# Patient Record
Sex: MALE | Race: WHITE | NOT HISPANIC OR LATINO | Employment: FULL TIME | URBAN - METROPOLITAN AREA
[De-identification: names, ages, dates, MRNs, and addresses within clinical notes are randomized per-mention and may not be internally consistent; named-entity substitution may affect disease eponyms.]

---

## 2017-05-12 DIAGNOSIS — I10 ESSENTIAL HYPERTENSION: ICD-10-CM

## 2017-05-12 RX ORDER — HYDROCHLOROTHIAZIDE 25 MG/1
TABLET ORAL
Qty: 90 TABLET | Refills: 0 | OUTPATIENT
Start: 2017-05-12

## 2017-05-12 RX ORDER — IRBESARTAN 300 MG/1
TABLET ORAL
Qty: 90 TABLET | Refills: 0 | OUTPATIENT
Start: 2017-05-12

## 2017-05-17 ENCOUNTER — OFFICE VISIT (OUTPATIENT)
Dept: FAMILY MEDICINE CLINIC | Facility: CLINIC | Age: 42
End: 2017-05-17

## 2017-05-17 VITALS
HEART RATE: 115 BPM | SYSTOLIC BLOOD PRESSURE: 154 MMHG | DIASTOLIC BLOOD PRESSURE: 98 MMHG | TEMPERATURE: 98.8 F | RESPIRATION RATE: 16 BRPM | BODY MASS INDEX: 34.87 KG/M2 | WEIGHT: 217 LBS | HEIGHT: 66 IN

## 2017-05-17 DIAGNOSIS — R73.9 HYPERGLYCEMIA: ICD-10-CM

## 2017-05-17 DIAGNOSIS — K21.9 GASTROESOPHAGEAL REFLUX DISEASE, ESOPHAGITIS PRESENCE NOT SPECIFIED: ICD-10-CM

## 2017-05-17 DIAGNOSIS — I10 ESSENTIAL HYPERTENSION: Primary | ICD-10-CM

## 2017-05-17 DIAGNOSIS — E78.49 OTHER HYPERLIPIDEMIA: ICD-10-CM

## 2017-05-17 PROCEDURE — 99214 OFFICE O/P EST MOD 30 MIN: CPT | Performed by: FAMILY MEDICINE

## 2017-05-17 RX ORDER — IRBESARTAN 300 MG/1
300 TABLET ORAL NIGHTLY
Qty: 90 TABLET | Refills: 1 | Status: SHIPPED | OUTPATIENT
Start: 2017-05-17 | End: 2017-12-13 | Stop reason: ALTCHOICE

## 2017-05-17 RX ORDER — RANITIDINE 150 MG/1
150 CAPSULE ORAL 2 TIMES DAILY
Qty: 180 CAPSULE | Refills: 1 | Status: SHIPPED | OUTPATIENT
Start: 2017-05-17 | End: 2022-09-26

## 2017-05-17 RX ORDER — HYDROCHLOROTHIAZIDE 25 MG/1
25 TABLET ORAL DAILY
Qty: 90 TABLET | Refills: 1 | Status: SHIPPED | OUTPATIENT
Start: 2017-05-17 | End: 2017-12-13 | Stop reason: SDUPTHER

## 2017-05-17 RX ORDER — ATORVASTATIN CALCIUM 10 MG/1
10 TABLET, FILM COATED ORAL NIGHTLY
Qty: 90 TABLET | Refills: 1 | Status: SHIPPED | OUTPATIENT
Start: 2017-05-17 | End: 2017-11-22

## 2017-05-17 RX ORDER — AMLODIPINE BESYLATE 10 MG/1
10 TABLET ORAL DAILY
Qty: 90 TABLET | Refills: 1 | Status: SHIPPED | OUTPATIENT
Start: 2017-05-17 | End: 2017-12-13 | Stop reason: SDUPTHER

## 2017-05-17 RX ORDER — CELECOXIB 200 MG/1
CAPSULE ORAL
Refills: 0 | COMMUNITY
Start: 2017-02-22 | End: 2017-12-13

## 2017-11-16 ENCOUNTER — TELEPHONE (OUTPATIENT)
Dept: FAMILY MEDICINE CLINIC | Facility: CLINIC | Age: 42
End: 2017-11-16

## 2017-12-06 ENCOUNTER — TRANSCRIBE ORDERS (OUTPATIENT)
Dept: ADMINISTRATIVE | Facility: HOSPITAL | Age: 42
End: 2017-12-06

## 2017-12-06 ENCOUNTER — OFFICE (OUTPATIENT)
Dept: URBAN - METROPOLITAN AREA CLINIC 75 | Facility: CLINIC | Age: 42
End: 2017-12-06

## 2017-12-06 VITALS
HEART RATE: 80 BPM | DIASTOLIC BLOOD PRESSURE: 62 MMHG | WEIGHT: 200 LBS | SYSTOLIC BLOOD PRESSURE: 110 MMHG | HEIGHT: 66 IN

## 2017-12-06 DIAGNOSIS — R94.5 ABNORMAL RESULTS OF LIVER FUNCTION STUDIES: ICD-10-CM

## 2017-12-06 DIAGNOSIS — R79.89 ABNORMAL LFTS: Primary | ICD-10-CM

## 2017-12-06 DIAGNOSIS — Z01.89 ENCOUNTER FOR OTHER SPECIFIED SPECIAL EXAMINATIONS: ICD-10-CM

## 2017-12-06 PROCEDURE — 99202 OFFICE O/P NEW SF 15 MIN: CPT

## 2017-12-13 ENCOUNTER — OFFICE VISIT (OUTPATIENT)
Dept: FAMILY MEDICINE CLINIC | Facility: CLINIC | Age: 42
End: 2017-12-13

## 2017-12-13 VITALS
BODY MASS INDEX: 31.66 KG/M2 | TEMPERATURE: 97.7 F | HEART RATE: 111 BPM | DIASTOLIC BLOOD PRESSURE: 101 MMHG | RESPIRATION RATE: 16 BRPM | WEIGHT: 197 LBS | HEIGHT: 66 IN | SYSTOLIC BLOOD PRESSURE: 163 MMHG

## 2017-12-13 DIAGNOSIS — R74.8 ELEVATED LIVER ENZYMES: ICD-10-CM

## 2017-12-13 DIAGNOSIS — K21.9 GASTROESOPHAGEAL REFLUX DISEASE, ESOPHAGITIS PRESENCE NOT SPECIFIED: ICD-10-CM

## 2017-12-13 DIAGNOSIS — I10 ESSENTIAL HYPERTENSION: Primary | ICD-10-CM

## 2017-12-13 DIAGNOSIS — E78.49 OTHER HYPERLIPIDEMIA: ICD-10-CM

## 2017-12-13 PROCEDURE — 99214 OFFICE O/P EST MOD 30 MIN: CPT | Performed by: FAMILY MEDICINE

## 2017-12-13 RX ORDER — VALSARTAN 320 MG/1
320 TABLET ORAL DAILY
Qty: 90 TABLET | Refills: 1 | Status: SHIPPED | OUTPATIENT
Start: 2017-12-13 | End: 2018-06-11

## 2017-12-13 RX ORDER — AMLODIPINE BESYLATE 10 MG/1
10 TABLET ORAL DAILY
Qty: 90 TABLET | Refills: 1 | Status: SHIPPED | OUTPATIENT
Start: 2017-12-13 | End: 2022-09-26

## 2017-12-13 RX ORDER — HYDROCHLOROTHIAZIDE 25 MG/1
25 TABLET ORAL DAILY
Qty: 90 TABLET | Refills: 1 | Status: SHIPPED | OUTPATIENT
Start: 2017-12-13 | End: 2022-09-26

## 2017-12-13 NOTE — PROGRESS NOTES
"Chief Complaint   Patient presents with   • Hypertension   • Hyperlipidemia       Roseann Schuster presents to the office today to refill his medications and review recent labs. No medication side effects are reported.  His arterial blood pressure is worse.  He has not taken his medications for the last couple of weeks.  He recently had some labs that showed significant elevation of liver function tests.  A copy of those labs was given to the patient.  He had repeat testing done last week and those results are not available.  He will call me ordering physician for those results.  We will switch his irbesartan to valsartan due to elimination and pharmacokinetics considerations.  For now, we will discontinue statin therapy because of liver function test issue.  He has stopped his rheumatoid arthritis medication and currently his arthritis is asymptomatic.  He has had a recent change in diet which he thinks is helping with this condition.  He will check blood pressure readings twice daily for 2 weeks and send a my chart message to me with those results after being on the new medication.    I have reviewed and updated his medications, medical history and problem list during today's office visit.        Social History   Substance Use Topics   • Smoking status: Current Some Day Smoker     Types: Electronic Cigarette   • Smokeless tobacco: Current User      Comment: e-cig   • Alcohol use No       Review of Systems   Constitutional: Negative for chills, diaphoresis, fatigue and fever.   Cardiovascular: Negative for chest pain.   Gastrointestinal: Negative for abdominal pain.   Genitourinary:        No dark urine   Skin: Negative for color change.       Objective   BP (!) 163/101  Pulse 111  Temp 97.7 °F (36.5 °C) (Oral)   Resp 16  Ht 167.6 cm (66\")  Wt 89.4 kg (197 lb)  BMI 31.8 kg/m2  Body mass index is 31.8 kg/(m^2).  Physical Exam   Constitutional: He is cooperative. No distress.   Eyes: Conjunctivae and lids " are normal.   Neck: Carotid bruit is not present. No tracheal deviation present.   Cardiovascular: Normal rate, regular rhythm and normal heart sounds.    No murmur heard.  Pulmonary/Chest: Effort normal and breath sounds normal.   Neurological: He is alert. He is not disoriented.   Skin: Skin is warm and dry.   Psychiatric: He has a normal mood and affect. His speech is normal and behavior is normal.   Vitals reviewed.      Data Reviewed:          Assessment/Plan     Problem List Items Addressed This Visit        Cardiovascular and Mediastinum    Hyperlipidemia    Relevant Orders    Lipid Panel With LDL/HDL Ratio    Essential hypertension - Primary    Relevant Medications    amLODIPine (NORVASC) 10 MG tablet    hydrochlorothiazide (HYDRODIURIL) 25 MG tablet    valsartan (DIOVAN) 320 MG tablet    Other Relevant Orders    Comprehensive metabolic panel    CBC and Differential    TSH       Digestive    RESOLVED: Gastroesophageal reflux disease       Other    Elevated liver enzymes          Outpatient Encounter Prescriptions as of 12/13/2017   Medication Sig Dispense Refill   • amLODIPine (NORVASC) 10 MG tablet Take 1 tablet by mouth Daily for 180 days. 90 tablet 1   • hydrochlorothiazide (HYDRODIURIL) 25 MG tablet Take 1 tablet by mouth Daily for 180 days. 90 tablet 1   • ranitidine (ZANTAC) 150 MG capsule Take 1 capsule by mouth 2 (Two) Times a Day for 180 days. 180 capsule 1   • valsartan (DIOVAN) 320 MG tablet Take 1 tablet by mouth Daily for 180 days. 90 tablet 1   • [DISCONTINUED] amLODIPine (NORVASC) 10 MG tablet Take 1 tablet by mouth Daily for 180 days. 90 tablet 1   • [DISCONTINUED] atorvastatin (LIPITOR) 10 MG tablet Take 1 tablet by mouth Every Night for 180 days. 90 tablet 1   • [DISCONTINUED] celecoxib (CeleBREX) 200 MG capsule TK 1 C PO QD  0   • [DISCONTINUED] folic acid (FOLVITE) 1 MG tablet Take  by mouth Daily. Take 4 tablets daily  3   • [DISCONTINUED] hydrochlorothiazide (HYDRODIURIL) 25 MG tablet  Take 1 tablet by mouth Daily for 180 days. 90 tablet 1   • [DISCONTINUED] irbesartan (AVAPRO) 300 MG tablet Take 1 tablet by mouth Every Night for 180 days. 90 tablet 1   • [DISCONTINUED] methotrexate 2.5 MG tablet   3     No facility-administered encounter medications on file as of 12/13/2017.        Orders Placed This Encounter   Procedures   • Comprehensive metabolic panel     Standing Status:   Future     Standing Expiration Date:   9/9/2018   • Lipid Panel With LDL/HDL Ratio     Standing Status:   Future     Standing Expiration Date:   9/9/2018   • TSH     Standing Status:   Future     Standing Expiration Date:   9/9/2018   • CBC and Differential     Standing Status:   Future     Standing Expiration Date:   9/9/2018     Order Specific Question:   Manual Differential     Answer:   No            F/U in 6 months

## 2017-12-20 ENCOUNTER — HOSPITAL ENCOUNTER (OUTPATIENT)
Dept: ULTRASOUND IMAGING | Facility: HOSPITAL | Age: 42
Discharge: HOME | End: 2017-12-20
Attending: INTERNAL MEDICINE | Admitting: INTERNAL MEDICINE

## 2017-12-20 DIAGNOSIS — R79.89 ABNORMAL LFTS: ICD-10-CM

## 2017-12-20 PROCEDURE — 76705 ECHO EXAM OF ABDOMEN: CPT

## 2018-05-12 ENCOUNTER — RESULTS ENCOUNTER (OUTPATIENT)
Dept: FAMILY MEDICINE CLINIC | Facility: CLINIC | Age: 43
End: 2018-05-12

## 2018-05-12 DIAGNOSIS — I10 ESSENTIAL HYPERTENSION: ICD-10-CM

## 2018-05-12 DIAGNOSIS — E78.49 OTHER HYPERLIPIDEMIA: ICD-10-CM

## 2021-04-02 ENCOUNTER — BULK ORDERING (OUTPATIENT)
Dept: CASE MANAGEMENT | Facility: OTHER | Age: 46
End: 2021-04-02

## 2021-04-02 DIAGNOSIS — Z23 IMMUNIZATION DUE: ICD-10-CM

## 2022-09-15 ENCOUNTER — TELEPHONE (OUTPATIENT)
Dept: FAMILY MEDICINE CLINIC | Facility: CLINIC | Age: 47
End: 2022-09-15

## 2022-09-15 NOTE — TELEPHONE ENCOUNTER
Caller: Landen Guido    Relationship: Self    Best call back number: [phone]    What is the best time to reach you: ANYTIME    Who are you requesting to speak with (clinical staff, provider,  specific staff member):CLINCIAL    What was the call regarding: PATIENT USED TO SEE DOCTOR ROBERTO, AND STILL WOULD LIKE TO SEE HIM IF HE WOULD ACCEPT HIM AS NEW PATIENT.     Do you require a callback: YES

## 2022-09-26 ENCOUNTER — OFFICE VISIT (OUTPATIENT)
Dept: FAMILY MEDICINE CLINIC | Facility: CLINIC | Age: 47
End: 2022-09-26

## 2022-09-26 VITALS
WEIGHT: 169 LBS | OXYGEN SATURATION: 100 % | DIASTOLIC BLOOD PRESSURE: 70 MMHG | RESPIRATION RATE: 18 BRPM | BODY MASS INDEX: 27.16 KG/M2 | TEMPERATURE: 97 F | HEIGHT: 66 IN | HEART RATE: 112 BPM | SYSTOLIC BLOOD PRESSURE: 138 MMHG

## 2022-09-26 DIAGNOSIS — M25.9 ELBOW DISORDER: ICD-10-CM

## 2022-09-26 DIAGNOSIS — R74.8 ELEVATED LIVER ENZYMES: ICD-10-CM

## 2022-09-26 DIAGNOSIS — R73.9 HYPERGLYCEMIA: ICD-10-CM

## 2022-09-26 DIAGNOSIS — M05.731 RHEUMATOID ARTHRITIS INVOLVING RIGHT WRIST WITH POSITIVE RHEUMATOID FACTOR: ICD-10-CM

## 2022-09-26 DIAGNOSIS — E78.49 OTHER HYPERLIPIDEMIA: ICD-10-CM

## 2022-09-26 DIAGNOSIS — I10 ESSENTIAL HYPERTENSION: Primary | ICD-10-CM

## 2022-09-26 PROCEDURE — 99204 OFFICE O/P NEW MOD 45 MIN: CPT | Performed by: FAMILY MEDICINE

## 2022-09-26 RX ORDER — VALSARTAN 80 MG/1
80 TABLET ORAL DAILY
Qty: 90 TABLET | Refills: 1 | Status: SHIPPED | OUTPATIENT
Start: 2022-09-26 | End: 2023-01-09 | Stop reason: SDUPTHER

## 2022-09-26 NOTE — ASSESSMENT & PLAN NOTE
Hypertension is worsening.  Medication changes per orders. valsartan 80 mg at hs dosing.   Blood pressure will be reassessed in 3 months.   no

## 2022-09-26 NOTE — PROGRESS NOTES
"Chief Complaint  Establish Care (Prev patient), Hypertension, and Rheumatoid Arthritis (Needs new referral)    Subjective          Landen presents to [facility]  To reestablish.  His blood pressure is mildly elevated.  Previously he had been treated for rheumatoid arthritis and that condition is worsening.  He does have an ongoing problem of decreased extension capabilities of left elbow and that has been present for the past year and getting worse.  Labs are due to follow-up on history of elevated liver enzymes and lipids and hyperglycemia in the past.  Review of Systems   Constitutional: Positive for fatigue.   Respiratory: Negative for cough and shortness of breath.    Cardiovascular: Negative for chest pain.   Gastrointestinal: Negative for abdominal distention.        Some reflux   Psychiatric/Behavioral: Negative for dysphoric mood.        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        Objective   Vital Signs:   Vitals:    09/26/22 1443   BP: 138/70   Pulse: 112   Resp: 18   Temp: 97 °F (36.1 °C)   SpO2: 100%   Weight: 76.7 kg (169 lb)   Height: 167 cm (65.75\")                Physical Exam  Vitals reviewed.   Constitutional:       General: He is not in acute distress.  Eyes:      General: Lids are normal.      Conjunctiva/sclera: Conjunctivae normal.   Neck:      Vascular: No carotid bruit.      Trachea: No tracheal deviation.   Cardiovascular:      Rate and Rhythm: Normal rate and regular rhythm.      Heart sounds: Normal heart sounds. No murmur heard.  Pulmonary:      Effort: Pulmonary effort is normal.      Breath sounds: Normal breath sounds.   Skin:     General: Skin is warm and dry.   Neurological:      Mental Status: He is alert. He is not disoriented.   Psychiatric:         Speech: Speech normal.         Behavior: Behavior normal. Behavior is cooperative.          Result Review :                Assessment and Plan    Diagnoses and all orders for this " visit:    1. Essential hypertension (Primary)  Assessment & Plan:  Hypertension is worsening.  Medication changes per orders. valsartan 80 mg at hs dosing.   Blood pressure will be reassessed in 3 months.    Orders:  -     Comprehensive Metabolic Panel; Future  -     CBC & Differential; Future  -     TSH; Future  -     MicroAlbumin, Urine, Random - Urine, Clean Catch; Future  -     valsartan (DIOVAN) 80 MG tablet; Take 1 tablet by mouth Daily for 180 days.  Dispense: 90 tablet; Refill: 1    2. Rheumatoid arthritis involving right wrist with positive rheumatoid factor (HCC)  -     Ambulatory Referral to Rheumatology  -     Ambulatory Referral to Orthopedic Surgery    3. Hyperglycemia  Assessment & Plan:  Recheck labs    Orders:  -     Comprehensive Metabolic Panel; Future  -     Hemoglobin A1c; Future  -     MicroAlbumin, Urine, Random - Urine, Clean Catch; Future    4. Elevated liver enzymes  Assessment & Plan:  Recheck labs    Orders:  -     Comprehensive Metabolic Panel; Future    5. Other hyperlipidemia  Assessment & Plan:  Check labs. Cholesterol content in food information shared.     Orders:  -     Lipid Panel; Future  -     CK; Future    6. Elbow disorder  -     Ambulatory Referral to Orthopedic Surgery      Follow Up   Return in about 3 months (around 12/26/2022) for recheck/refill medication.  Patient was given instructions and counseling regarding his condition or for health maintenance advice. Please see specific information pulled into the AVS if appropriate.

## 2022-09-29 ENCOUNTER — PATIENT ROUNDING (BHMG ONLY) (OUTPATIENT)
Dept: FAMILY MEDICINE CLINIC | Facility: CLINIC | Age: 47
End: 2022-09-29

## 2022-09-29 NOTE — PROGRESS NOTES
My name is Irma the practice manager.    I want to officially welcome you to our practice, and ask about your recent visit.    If you could tell me about your recent visit with us.  What things went well?    We are always looking for ways to make our patients' experience even better.  Do you have any recommendations on ways we can improve?    Overall were you satisfied with your first visit with our practice?    I appreciate you taking the time to answer a few questions today.        Thank you, and have a great day!

## 2022-10-11 ENCOUNTER — TELEPHONE (OUTPATIENT)
Dept: FAMILY MEDICINE CLINIC | Facility: CLINIC | Age: 47
End: 2022-10-11

## 2022-10-11 DIAGNOSIS — R53.83 FATIGUE, UNSPECIFIED TYPE: ICD-10-CM

## 2022-10-11 DIAGNOSIS — D72.829 LEUKOCYTOSIS, UNSPECIFIED TYPE: Primary | ICD-10-CM

## 2022-10-11 NOTE — TELEPHONE ENCOUNTER
----- Message from Antonino Walden MD sent at 10/9/2022  5:01 PM EDT -----  Call patient with result. Send him a low sugar diet. Set him up to see hematology, Dr. Gomes, for diagnosis of elevated white blood cell count and fatigue.

## 2022-11-03 ENCOUNTER — OFFICE VISIT (OUTPATIENT)
Dept: ORTHOPEDIC SURGERY | Facility: CLINIC | Age: 47
End: 2022-11-03

## 2022-11-03 VITALS — BODY MASS INDEX: 24.11 KG/M2 | TEMPERATURE: 97.6 F | HEIGHT: 66 IN | WEIGHT: 150 LBS

## 2022-11-03 DIAGNOSIS — M77.12 LATERAL EPICONDYLITIS OF LEFT ELBOW: ICD-10-CM

## 2022-11-03 DIAGNOSIS — M05.731 RHEUMATOID ARTHRITIS INVOLVING RIGHT WRIST WITH POSITIVE RHEUMATOID FACTOR: ICD-10-CM

## 2022-11-03 DIAGNOSIS — R52 PAIN: ICD-10-CM

## 2022-11-03 DIAGNOSIS — M05.722 RHEUMATOID ARTHRITIS INVOLVING LEFT ELBOW WITH POSITIVE RHEUMATOID FACTOR: ICD-10-CM

## 2022-11-03 DIAGNOSIS — M05.731 RHEUMATOID ARTHRITIS INVOLVING RIGHT WRIST WITH POSITIVE RHEUMATOID FACTOR: Primary | ICD-10-CM

## 2022-11-03 DIAGNOSIS — M77.12 LATERAL EPICONDYLITIS OF LEFT ELBOW: Primary | ICD-10-CM

## 2022-11-03 PROCEDURE — 73110 X-RAY EXAM OF WRIST: CPT | Performed by: ORTHOPAEDIC SURGERY

## 2022-11-03 PROCEDURE — 99204 OFFICE O/P NEW MOD 45 MIN: CPT | Performed by: ORTHOPAEDIC SURGERY

## 2022-11-03 PROCEDURE — 73070 X-RAY EXAM OF ELBOW: CPT | Performed by: ORTHOPAEDIC SURGERY

## 2022-11-03 NOTE — PROGRESS NOTES
General Exam    Patient: Landen Guido    YOB: 1975    Medical Record Number: [id number]    Chief Complaints: Left elbow and right wrist pain    History of Present Illness:     47 y.o. male patient who presents for evaluation treatment left elbow and right wrist pain.  Patient states he has diagnosis of rheumatoid arthritis about 6 years ago he had treatment medically for about a year then got off his medication.  He does report dermatology at the end of this month.  States he has some limited elbow function and some lateral elbow pain of the left elbow.  Also has some wrist pain and limited function of the right wrist.  Occasional numbness and tingling of the thumb and ring and pinky of the right hand.  He is right-hand dominant.     Denies any fevers, cough or shortness of breath.    Allergies: No Known Allergies    Home Medications:      Current Outpatient Medications:   •  valsartan (DIOVAN) 80 MG tablet, Take 1 tablet by mouth Daily for 180 days., Disp: 90 tablet, Rfl: 1    Past Medical History:   Diagnosis Date   • Anxiety    • Arthritis    • GERD (gastroesophageal reflux disease)    • Hyperlipidemia    • Hypertension        Past Surgical History:   Procedure Laterality Date   • COLONOSCOPY  2008       Social History     Occupational History   • Not on file   Tobacco Use   • Smoking status: Some Days     Packs/day: 0.50     Years: 30.00     Pack years: 15.00     Types: Electronic Cigarette, Cigarettes, Cigars   • Smokeless tobacco: Current   • Tobacco comments:     e-cig   Substance and Sexual Activity   • Alcohol use: Yes     Alcohol/week: 9.0 standard drinks     Types: 6 Cans of beer, 3 Shots of liquor per week     Comment: daily   • Drug use: Never   • Sexual activity: Yes     Partners: Female     Birth control/protection: Condom      Social History     Social History Narrative   • Not on file       Family History   Problem Relation Age of Onset   • Hypertension Mother    • Anxiety  "disorder Mother    • Alcohol abuse Father    • Anxiety disorder Father    • Depression Father    • Heart disease Father    • Hypertension Father        Review of Systems:      Constitutional: Denies fever, shaking or chills         All other pertinent positives and negatives as noted above in HPI.    Physical Exam: 47 y.o. male    Vitals:    11/03/22 0940   Temp: 97.6 °F (36.4 °C)   Weight: 68 kg (150 lb)   Height: 167.6 cm (66\")       General:  Patient is awake and alert.  Appears in no acute distress or discomfort.        Musculoskeletal/Extremities:    Left upper extremity examined positive tenderness to palpation over the lateral condyle and discomfort with wrist extension.  Limited elbow range of motion 20 degrees to 95 degrees actively and passively.  Motor and sensory intact distally.    Right wrist motion is limited compared to the left particularly with extension.  Motor and sensory intact distally.  No tenderness palpation.         Radiology:       Left elbow AP and lateral views as well as right wrist films taken and reviewed to evaluate the patient's complaint/s.    Elbow films do show degenerative changes above the elbow joint itself consistent with likely rheumatoid arthritis.  No acute fractures noted.    Wrist films do show degenerative changes of the wrist and some carpus.  No acute fractures noted.     No imaging for comparison.    Assessment: Left elbow lateral epicondylitis, left elbow rheumatoid arthritis, right wrist rheumatoid arthritis      Plan:      Discussed the findings the patient regards to his epicondylitis recommend conservative treatment consisting of the modification, rest, ice, night splint and strap.  Also take anti-inflammatories he also can try some gel anti-inflammatory medication.    In regards to his rheumatoid thrice of the elbow and wrist I will make a referral to [facility] particularly Dr. Balir.  Encouraged patient to see his rheumatologist and get back on " his medications as well.        We will plan for follow up as needed.    All questions were answered.  Patient understands and agrees with the plan.    Landen Bang MD    11/03/2022    CC to Antonino Walden MD

## 2022-11-11 ENCOUNTER — LAB (OUTPATIENT)
Dept: OTHER | Facility: HOSPITAL | Age: 47
End: 2022-11-11

## 2022-11-11 ENCOUNTER — CONSULT (OUTPATIENT)
Dept: ONCOLOGY | Facility: CLINIC | Age: 47
End: 2022-11-11

## 2022-11-11 VITALS
HEART RATE: 101 BPM | SYSTOLIC BLOOD PRESSURE: 163 MMHG | DIASTOLIC BLOOD PRESSURE: 99 MMHG | RESPIRATION RATE: 18 BRPM | WEIGHT: 164.3 LBS | TEMPERATURE: 98 F | BODY MASS INDEX: 26.41 KG/M2 | OXYGEN SATURATION: 99 % | HEIGHT: 66 IN

## 2022-11-11 DIAGNOSIS — D72.829 LEUKOCYTOSIS, UNSPECIFIED TYPE: Primary | ICD-10-CM

## 2022-11-11 DIAGNOSIS — D72.9 NEUTROPHILIC LEUKOCYTOSIS: Primary | ICD-10-CM

## 2022-11-11 PROBLEM — D72.828 NEUTROPHILIC LEUKOCYTOSIS: Status: ACTIVE | Noted: 2022-11-11

## 2022-11-11 LAB
BASOPHILS # BLD AUTO: 0.04 10*3/MM3 (ref 0–0.2)
BASOPHILS NFR BLD AUTO: 0.4 % (ref 0–1.5)
DEPRECATED RDW RBC AUTO: 47.3 FL (ref 37–54)
EOSINOPHIL # BLD AUTO: 0.08 10*3/MM3 (ref 0–0.4)
EOSINOPHIL NFR BLD AUTO: 0.7 % (ref 0.3–6.2)
ERYTHROCYTE [DISTWIDTH] IN BLOOD BY AUTOMATED COUNT: 15.9 % (ref 12.3–15.4)
HCT VFR BLD AUTO: 41.4 % (ref 37.5–51)
HGB BLD-MCNC: 13.1 G/DL (ref 13–17.7)
IMM GRANULOCYTES # BLD AUTO: 0.02 10*3/MM3 (ref 0–0.05)
IMM GRANULOCYTES NFR BLD AUTO: 0.2 % (ref 0–0.5)
LYMPHOCYTES # BLD AUTO: 1.96 10*3/MM3 (ref 0.7–3.1)
LYMPHOCYTES NFR BLD AUTO: 18 % (ref 19.6–45.3)
MCH RBC QN AUTO: 25.9 PG (ref 26.6–33)
MCHC RBC AUTO-ENTMCNC: 31.6 G/DL (ref 31.5–35.7)
MCV RBC AUTO: 81.8 FL (ref 79–97)
MONOCYTES # BLD AUTO: 0.46 10*3/MM3 (ref 0.1–0.9)
MONOCYTES NFR BLD AUTO: 4.2 % (ref 5–12)
NEUTROPHILS NFR BLD AUTO: 76.5 % (ref 42.7–76)
NEUTROPHILS NFR BLD AUTO: 8.34 10*3/MM3 (ref 1.7–7)
NRBC BLD AUTO-RTO: 0 /100 WBC (ref 0–0.2)
PLATELET # BLD AUTO: 327 10*3/MM3 (ref 140–450)
PMV BLD AUTO: 7.8 FL (ref 6–12)
RBC # BLD AUTO: 5.06 10*6/MM3 (ref 4.14–5.8)
WBC NRBC COR # BLD: 10.9 10*3/MM3 (ref 3.4–10.8)

## 2022-11-11 PROCEDURE — 85025 COMPLETE CBC W/AUTO DIFF WBC: CPT | Performed by: INTERNAL MEDICINE

## 2022-11-11 PROCEDURE — 99204 OFFICE O/P NEW MOD 45 MIN: CPT | Performed by: INTERNAL MEDICINE

## 2022-11-11 PROCEDURE — 36415 COLL VENOUS BLD VENIPUNCTURE: CPT

## 2022-11-11 NOTE — PROGRESS NOTES
REFERRING PROVIDER:    Antonino Walden MD  [address]  [place] [address]    REASON FOR CONSULTATION:    Leukocytosis with neutrophilia    HISTORY OF PRESENT ILLNESS:  Landen Guido is a 47 y.o. male who is referred today for further evaluation of leukocytosis with neutrophilia.    Back in 2017 the white blood cell count was normal.    On 10/7/2022 the white blood cell count was 13.5 with a differential showing 79.5% neutrophils and 13.2% lymphocytes.  Hemoglobin was normal at 13.3 with normal platelets 445,000.    He has rheumatoid arthritis previously treated with methotrexate and another agent.  He is not sure what the other agent was.  He has not been treated for several years.  He complains of arthralgias, particularly left elbow pain.  He notes limited range of motion particularly of the left elbow.  He denies any infectious symptoms.  No recent steroid use.  He does smoke 1 pack/day.        Past Medical History:   Diagnosis Date   • Anxiety    • Arthritis    • GERD (gastroesophageal reflux disease)    • Hyperlipidemia    • Hypertension        Past Surgical History:   Procedure Laterality Date   • COLONOSCOPY  2008       SOCIAL HISTORY:   reports that he has been smoking electronic cigarette, cigarettes, and cigars. He has a 15.00 pack-year smoking history. He uses smokeless tobacco. He reports current alcohol use of about 9.0 standard drinks per week. He reports current drug use. Drug: Marijuana.    FAMILY HISTORY:  family history includes Alcohol abuse in his father; Anxiety disorder in his father and mother; Arthritis in his mother; Depression in his father; Diabetes in his mother; Heart disease in his father; Hypertension in his father and mother.    ALLERGIES:  No Known Allergies    MEDICATIONS:  The medication list has been reviewed with the patient by the medical assistant, and the list has been updated in the electronic medical record, which I reviewed.  Medication dosages  "and frequencies were confirmed to be accurate.    Review of Systems   Musculoskeletal: Positive for arthralgias.   All other systems reviewed and are negative.      PHYSICAL EXAMINATION:  Vitals:    11/11/22 1510   BP: 163/99   Pulse: 101   Resp: 18   Temp: 98 °F (36.7 °C)   TempSrc: Temporal   SpO2: 99%   Weight: 74.5 kg (164 lb 4.8 oz)   Height: 167.6 cm (65.98\")   PainSc:   4   PainLoc: Comment: Left elbow and right wrist.       Physical Exam  Vitals and nursing note reviewed.   Constitutional:       General: He is not in acute distress.     Appearance: He is well-developed.   HENT:      Head: Normocephalic and atraumatic. Hair is normal.      Comments: Wearing a facemask  Eyes:      General: Lids are normal.      Conjunctiva/sclera: Conjunctivae normal.      Pupils: Pupils are equal.   Neck:      Thyroid: No thyroid mass or thyromegaly.      Vascular: No JVD.   Cardiovascular:      Rate and Rhythm: Normal rate and regular rhythm.      Heart sounds: No murmur heard.    No friction rub. No gallop.   Pulmonary:      Effort: Pulmonary effort is normal. No respiratory distress.      Breath sounds: Normal breath sounds. No wheezing or rales.   Abdominal:      General: There is no distension.      Palpations: Abdomen is soft. There is no hepatomegaly, splenomegaly or mass.      Tenderness: There is no abdominal tenderness. There is no guarding.   Musculoskeletal:         General: No tenderness or deformity.      Cervical back: Neck supple.   Lymphadenopathy:      Cervical: No cervical adenopathy.      Upper Body:      Right upper body: No supraclavicular adenopathy.      Left upper body: No supraclavicular adenopathy.   Skin:     General: Skin is warm and dry.      Findings: No rash.   Neurological:      Mental Status: He is alert and oriented to person, place, and time.   Psychiatric:         Behavior: Behavior normal.         Thought Content: Thought content normal.         Judgment: Judgment normal. "         DIAGNOSTIC DATA:  CBC & Differential (11/11/2022 14:37)      IMAGING:    None reviewed    ASSESSMENT:  This is a 47 y.o. male with:    *Rheumatoid arthritis  • Currently not being treated    *Leukocytosis with neutrophilia  • Almost certainly reactive secondary to pain as well as cigarette smoking  • White blood cell count is nearly normal at 10.9 today with mild neutrophilia    PLAN:   1. No further evaluation of mild leukocytosis with neutrophilia is necessary at this time.  He plans to see rheumatology in hopes of getting his disease under better control.  I encouraged him to stop smoking and he plans to work on this.  2. Follow-up here as needed    ORDERS PLACED DURING THIS ENCOUNTER  No orders of the defined types were placed in this encounter.

## 2022-11-14 ENCOUNTER — PATIENT ROUNDING (BHMG ONLY) (OUTPATIENT)
Dept: ONCOLOGY | Facility: CLINIC | Age: 47
End: 2022-11-14

## 2023-01-09 ENCOUNTER — OFFICE VISIT (OUTPATIENT)
Dept: FAMILY MEDICINE CLINIC | Facility: CLINIC | Age: 48
End: 2023-01-09
Payer: COMMERCIAL

## 2023-01-09 VITALS
SYSTOLIC BLOOD PRESSURE: 146 MMHG | OXYGEN SATURATION: 100 % | WEIGHT: 165 LBS | HEART RATE: 109 BPM | BODY MASS INDEX: 26.52 KG/M2 | RESPIRATION RATE: 18 BRPM | DIASTOLIC BLOOD PRESSURE: 92 MMHG | HEIGHT: 66 IN | TEMPERATURE: 98.3 F

## 2023-01-09 DIAGNOSIS — E78.49 OTHER HYPERLIPIDEMIA: ICD-10-CM

## 2023-01-09 DIAGNOSIS — I10 ESSENTIAL HYPERTENSION: Primary | ICD-10-CM

## 2023-01-09 PROBLEM — R74.8 ELEVATED LIVER ENZYMES: Status: RESOLVED | Noted: 2017-12-13 | Resolved: 2023-01-09

## 2023-01-09 PROCEDURE — 99214 OFFICE O/P EST MOD 30 MIN: CPT | Performed by: FAMILY MEDICINE

## 2023-01-09 RX ORDER — SULFASALAZINE 500 MG/1
1000 TABLET ORAL 3 TIMES DAILY
COMMUNITY
Start: 2022-12-17

## 2023-01-09 RX ORDER — VALSARTAN 80 MG/1
80 TABLET ORAL DAILY
Qty: 90 TABLET | Refills: 1 | Status: SHIPPED | OUTPATIENT
Start: 2023-01-09 | End: 2023-01-09 | Stop reason: SDUPTHER

## 2023-01-09 RX ORDER — VALSARTAN 160 MG/1
160 TABLET ORAL DAILY
Qty: 90 TABLET | Refills: 1 | Status: SHIPPED | OUTPATIENT
Start: 2023-01-09 | End: 2023-03-08 | Stop reason: SDUPTHER

## 2023-01-09 NOTE — PROGRESS NOTES
Chief Complaint  Hypertension (3 month f/u)    Roseann Schuster presents to [facility]     To refill medicines.  Blood pressure is still above goal although there has been interval improvement.  He remains under the care of of both hand specialist and rheumatologist.  Next appointment with rheumatology this week.  Problem list has been updated with correct diagnosis for rheumatoid like problem.        Objective   Vital Signs:   Vitals:    01/09/23 1129   BP: 146/92   Pulse: 109   Resp: 18   Temp: 98.3 °F (36.8 °C)   SpO2: 100%   Weight: 74.8 kg (165 lb)   Height: 167.6 cm (65.98\")                Physical Exam  Vitals reviewed.   Constitutional:       General: He is not in acute distress.  Eyes:      General: Lids are normal.      Conjunctiva/sclera: Conjunctivae normal.   Neck:      Vascular: No carotid bruit.      Trachea: No tracheal deviation.   Cardiovascular:      Rate and Rhythm: Normal rate and regular rhythm.      Heart sounds: Normal heart sounds. No murmur heard.  Pulmonary:      Effort: Pulmonary effort is normal.      Breath sounds: Normal breath sounds.   Skin:     General: Skin is warm and dry.   Neurological:      Mental Status: He is alert. He is not disoriented.   Psychiatric:         Speech: Speech normal.         Behavior: Behavior normal. Behavior is cooperative.          Result Review :                Assessment and Plan    Diagnoses and all orders for this visit:    1. Essential hypertension (Primary)  Assessment & Plan:  Blood pressure above goal.  Increase valsartan to 160 mg daily.  Recheck in office in 2 months.    Orders:  -     Discontinue: valsartan (DIOVAN) 80 MG tablet; Take 1 tablet by mouth Daily for 180 days.  Dispense: 90 tablet; Refill: 1  -     valsartan (DIOVAN) 160 MG tablet; Take 1 tablet by mouth Daily for 180 days.  Dispense: 90 tablet; Refill: 1  -     Comprehensive Metabolic Panel; Future  -     CBC & Differential; Future  -      TSH; Future    2. Other hyperlipidemia  -     CK; Future  -     Lipid Panel With / Chol / HDL Ratio; Future      Follow Up   Return in about 2 months (around 3/9/2023) for recheck of current condition.  Patient was given instructions and counseling regarding his condition or for health maintenance advice. Please see specific information pulled into the AVS if appropriate.

## 2023-03-08 ENCOUNTER — OFFICE VISIT (OUTPATIENT)
Dept: FAMILY MEDICINE CLINIC | Facility: CLINIC | Age: 48
End: 2023-03-08
Payer: COMMERCIAL

## 2023-03-08 VITALS
OXYGEN SATURATION: 99 % | BODY MASS INDEX: 27.64 KG/M2 | HEIGHT: 66 IN | DIASTOLIC BLOOD PRESSURE: 74 MMHG | WEIGHT: 172 LBS | TEMPERATURE: 97.1 F | RESPIRATION RATE: 18 BRPM | HEART RATE: 97 BPM | SYSTOLIC BLOOD PRESSURE: 122 MMHG

## 2023-03-08 DIAGNOSIS — I10 ESSENTIAL HYPERTENSION: Primary | ICD-10-CM

## 2023-03-08 DIAGNOSIS — Z12.12 ENCOUNTER FOR COLORECTAL CANCER SCREENING: ICD-10-CM

## 2023-03-08 DIAGNOSIS — R73.01 IFG (IMPAIRED FASTING GLUCOSE): ICD-10-CM

## 2023-03-08 DIAGNOSIS — Z13.6 SCREENING FOR ISCHEMIC HEART DISEASE: ICD-10-CM

## 2023-03-08 DIAGNOSIS — Z11.59 NEED FOR HEPATITIS C SCREENING TEST: ICD-10-CM

## 2023-03-08 DIAGNOSIS — Z12.11 ENCOUNTER FOR COLORECTAL CANCER SCREENING: ICD-10-CM

## 2023-03-08 DIAGNOSIS — Z23 IMMUNIZATION DUE: ICD-10-CM

## 2023-03-08 PROCEDURE — 90750 HZV VACC RECOMBINANT IM: CPT | Performed by: FAMILY MEDICINE

## 2023-03-08 PROCEDURE — 99214 OFFICE O/P EST MOD 30 MIN: CPT | Performed by: FAMILY MEDICINE

## 2023-03-08 PROCEDURE — 90471 IMMUNIZATION ADMIN: CPT | Performed by: FAMILY MEDICINE

## 2023-03-08 RX ORDER — VALSARTAN 160 MG/1
160 TABLET ORAL DAILY
Qty: 90 TABLET | Refills: 3 | Status: SHIPPED | OUTPATIENT
Start: 2023-03-08 | End: 2024-03-07

## 2023-03-08 NOTE — PROGRESS NOTES
"Chief Complaint  Hypertension (F/u)    Roseann Schuster presents to [facility] for lab review and to refill current medications. Overall he feels well. No medication side effects are reported.  Patient is having no cubital decompression surgery in the near future with his orthopedist on the left elbow.    Patient due for colon cancer screening and first of 2 Shingrix vaccines.          Objective   Vital Signs:   Vitals:    03/08/23 0807   BP: 122/74   Pulse: 97   Resp: 18   Temp: 97.1 °F (36.2 °C)   SpO2: 99%   Weight: 78 kg (172 lb)   Height: 167.6 cm (65.98\")        BMI is >= 25 and <30. (Overweight) The following options were offered after discussion;: weight loss educational material (shared in after visit summary), exercise counseling/recommendations and nutrition counseling/recommendations        Physical Exam  Vitals reviewed.   Constitutional:       General: He is not in acute distress.  Eyes:      General: Lids are normal.      Conjunctiva/sclera: Conjunctivae normal.   Neck:      Vascular: No carotid bruit.      Trachea: No tracheal deviation.   Cardiovascular:      Rate and Rhythm: Normal rate and regular rhythm.      Heart sounds: Normal heart sounds. No murmur heard.  Pulmonary:      Effort: Pulmonary effort is normal.      Breath sounds: Normal breath sounds.   Skin:     General: Skin is warm and dry.   Neurological:      Mental Status: He is alert. He is not disoriented.   Psychiatric:         Speech: Speech normal.         Behavior: Behavior normal. Behavior is cooperative.          Result Review :     The following data was reviewed by: Antonino Walden MD on 03/08/2023:  TSH (03/02/2023 09:02)  Lipid Panel With / Chol / HDL Ratio (03/02/2023 09:02)  CK (03/02/2023 09:02)  CBC & Differential (03/02/2023 09:02)  Comprehensive Metabolic Panel (03/02/2023 09:02)           Assessment and Plan    Diagnoses and all orders for this visit:    1. Essential hypertension " (Primary)  Assessment & Plan:  Hypertension is improving with treatment.  Continue current treatment regimen.  Blood pressure will be reassessed at the next regular appointment.    Orders:  -     valsartan (DIOVAN) 160 MG tablet; Take 1 tablet by mouth Daily.  Dispense: 90 tablet; Refill: 3  -     Comprehensive Metabolic Panel; Future  -     CBC & Differential; Future  -     TSH; Future    2. Encounter for colorectal cancer screening  -     Cologuard - Stool, Per Rectum; Future    3. IFG (impaired fasting glucose)  Assessment & Plan:  Continue to monitor with yearly labs. Low carbohydrate  diet shared in AVS    Orders:  -     Comprehensive Metabolic Panel; Future  -     Hemoglobin A1c; Future  -     MicroAlbumin, Urine, Random - Urine, Clean Catch; Future    4. Need for hepatitis C screening test  -     Hepatitis C Antibody; Future    5. Screening for ischemic heart disease  -     Lipid Panel; Future  -     CK; Future      Follow Up   Return in about 1 year (around 3/8/2024) for Adult wellness & medication appt, schedule 30 min.  Patient was given instructions and counseling regarding his condition or for health maintenance advice. Please see specific information pulled into the AVS if appropriate.

## 2023-03-08 NOTE — PROGRESS NOTES
Immunization  Immunization performed in LD by Chelsey Gonzalez MA. Patient tolerated the procedure well without complications.  03/08/23   Chelsey Gonzalez MA

## 2023-03-08 NOTE — PATIENT INSTRUCTIONS
You are due for Shingrix vaccination series ( the newest shingles vaccine).  It is a two shot series spaced 2-6 months apart. Please get this vaccine series started at your earliest convenience at your local pharmacy to help avoid shingles outbreak. It is more effective than the old Zostavax vaccine and is recommended even if you have had the Zostavax vaccine in the past.  Once the Shingrix series is completed, it does not need to be repeated.   For more information, please look at the website below:  [facility] [place] Shingrix Vaccine Information

## 2024-03-18 ENCOUNTER — TELEPHONE (OUTPATIENT)
Dept: FAMILY MEDICINE CLINIC | Facility: CLINIC | Age: 49
End: 2024-03-18
Payer: COMMERCIAL

## 2024-03-18 DIAGNOSIS — R73.01 IFG (IMPAIRED FASTING GLUCOSE): Primary | ICD-10-CM

## 2024-03-18 DIAGNOSIS — E03.9 ACQUIRED HYPOTHYROIDISM: ICD-10-CM

## 2024-03-18 DIAGNOSIS — I10 ESSENTIAL HYPERTENSION: ICD-10-CM

## 2024-03-18 DIAGNOSIS — Z11.59 NEED FOR HEPATITIS C SCREENING TEST: ICD-10-CM

## 2024-03-18 DIAGNOSIS — E78.49 OTHER HYPERLIPIDEMIA: ICD-10-CM

## 2024-03-18 NOTE — TELEPHONE ENCOUNTER
Caller: Landen Guido    Relationship: Self    Best call back number: [phone]    What is the best time to reach you: ANYTIME     Who are you requesting to speak with (clinical staff, provider,  specific staff member): DR. MEJIA     What was the call regarding: PATIENT STATES HE IS WANTING TO KNOW IF HE IS NEEDING TO HAVE LABS DONE PRIOR TO HIS MEDICATION REFILL APPOINTMENT ON 04/01/2024.     PATIENT IS REQUESTING A CALL BACK TO LET HIM KNOW.

## 2024-03-27 NOTE — TELEPHONE ENCOUNTER
[facility] from [facility] call and reports elevated LFT and pt is to stop methotrexate, tylenol,atorvastatin, and celebrex, per Dr. Walden. Lynda reports he will f/u for repeat labs with them. Dr. Walden made aware and agrees.   show

## 2024-04-01 ENCOUNTER — OFFICE VISIT (OUTPATIENT)
Dept: FAMILY MEDICINE CLINIC | Facility: CLINIC | Age: 49
End: 2024-04-01
Payer: COMMERCIAL

## 2024-04-01 VITALS
DIASTOLIC BLOOD PRESSURE: 86 MMHG | HEIGHT: 66 IN | OXYGEN SATURATION: 99 % | SYSTOLIC BLOOD PRESSURE: 158 MMHG | HEART RATE: 115 BPM | WEIGHT: 167 LBS | BODY MASS INDEX: 26.84 KG/M2

## 2024-04-01 DIAGNOSIS — R73.01 IFG (IMPAIRED FASTING GLUCOSE): Primary | ICD-10-CM

## 2024-04-01 DIAGNOSIS — I10 ESSENTIAL HYPERTENSION: ICD-10-CM

## 2024-04-01 PROBLEM — G56.20 CUBITAL TUNNEL SYNDROME: Status: ACTIVE | Noted: 2023-03-22

## 2024-04-01 PROCEDURE — 99214 OFFICE O/P EST MOD 30 MIN: CPT | Performed by: FAMILY MEDICINE

## 2024-04-01 RX ORDER — VALSARTAN 320 MG/1
320 TABLET ORAL DAILY
Qty: 90 TABLET | Refills: 1 | Status: SHIPPED | OUTPATIENT
Start: 2024-04-01 | End: 2024-09-28

## 2024-04-01 NOTE — PROGRESS NOTES
"Chief Complaint  Med Refill    Subjective        HPI   History of Present Illness  The patient is a 49-year-old male who presents for a routine follow-up visit.    The patient's blood pressure was noted to be elevated this morning. He reports no adverse effects from his current medication regimen, which he administers in the morning.  He does not monitor his blood pressure at home, but intends to acquire a free blood pressure cuff through his workplace. He also consumes two cups of coffee upon awakening, .          Vital Signs:   Vitals:    04/01/24 0815   BP: 158/86   Pulse: 115   SpO2: 99%   Weight: 75.8 kg (167 lb)   Height: 167.6 cm (66\")            4/1/2024     8:18 AM   PHQ-2/PHQ-9 Depression Screening   Little Interest or Pleasure in Doing Things 0-->not at all   Feeling Down, Depressed or Hopeless 0-->not at all   PHQ-9: Brief Depression Severity Measure Score 0       BMI is >= 25 and <30. (Overweight) The following options were offered after discussion;: weight loss educational material (shared in after visit summary), exercise counseling/recommendations, and nutrition counseling/recommendations        Physical Exam  Physical Exam  The patient's appearance appears normal.  The patient's sclerae are mildly injected. The conjunctivae are normal. Pupils are round and equal.  No thyromegaly or nodules on the thyroid. No carotid bruits in the neck.  The patient's lung fields are clear in all lobes and all quadrants.  The patient's heart examination is normal. No murmur, rubs, or gallop.    Vital Signs  The patient's blood pressure is 148/90.               Results                  Assessment and Plan        New Medications Ordered This Visit   Medications    valsartan (DIOVAN) 320 MG tablet     Sig: Take 1 tablet by mouth Daily for 180 days.     Dispense:  90 tablet     Refill:  1     Assessment & Plan  1. Essential Hypertension.  The condition has not yet fully managed. The dosage of Valsartan will be escalated " to 320 mg, while the dosage will be adjusted to bedtime. The patient is instructed to take an additional Valsartan 160 mg dose tonight, as he has already consumed this morning's dose. Starting tomorrow, the dosage will be increased to 320 mg at bedtime.    Follow-up  The patient is scheduled for a follow-up visit in 2 months.         Patient was given instructions and counseling regarding his condition or for health maintenance advice. Please see specific information pulled into the AVS if appropriate.     Patient or patient representative verbalized consent for the use of Ambient Listening during the visit with  Antonino Wadlen MD for chart documentation. 4/1/2024  08:33 EDT

## 2024-06-03 ENCOUNTER — OFFICE VISIT (OUTPATIENT)
Dept: FAMILY MEDICINE CLINIC | Facility: CLINIC | Age: 49
End: 2024-06-03
Payer: COMMERCIAL

## 2024-06-03 VITALS
DIASTOLIC BLOOD PRESSURE: 90 MMHG | HEIGHT: 66 IN | HEART RATE: 99 BPM | WEIGHT: 164 LBS | OXYGEN SATURATION: 100 % | SYSTOLIC BLOOD PRESSURE: 154 MMHG | BODY MASS INDEX: 26.36 KG/M2

## 2024-06-03 DIAGNOSIS — R73.01 IFG (IMPAIRED FASTING GLUCOSE): ICD-10-CM

## 2024-06-03 DIAGNOSIS — I10 ESSENTIAL HYPERTENSION: Primary | ICD-10-CM

## 2024-06-03 PROCEDURE — 99214 OFFICE O/P EST MOD 30 MIN: CPT | Performed by: FAMILY MEDICINE

## 2024-06-03 RX ORDER — IBUPROFEN 200 MG
200 TABLET ORAL EVERY 6 HOURS PRN
COMMUNITY

## 2024-06-03 RX ORDER — AMLODIPINE BESYLATE 5 MG/1
5 TABLET ORAL NIGHTLY
Qty: 90 TABLET | Refills: 2 | Status: SHIPPED | OUTPATIENT
Start: 2024-06-03 | End: 2025-02-28

## 2024-06-03 RX ORDER — VALSARTAN 320 MG/1
320 TABLET ORAL DAILY
Qty: 90 TABLET | Refills: 2 | Status: SHIPPED | OUTPATIENT
Start: 2024-06-03 | End: 2025-02-28

## 2024-06-03 NOTE — PROGRESS NOTES
"Chief Complaint  Follow-up (2 month) and Hypertension    Subjective        HPI   History of Present Illness  The patient is a 49-year-old male who presents for evaluation of hypertension.    The patient is currently on a regimen of Valsartan 320, administered nightly, and reports no adverse effects. He has not been monitoring his blood pressure at home. His only medication has been Valsartan, and he recalls previous treatment with amlodipine and Valsartan 5 mg approximately 5 to 6 years ago, both of which yielded positive results without any reported side effects. He acknowledges the need to incorporate exercise into his routine.    Supplemental Information  He is planning on getting another surgery on his left elbow next year. He can not fully extend his left elbow. 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          Vital Signs:   Vitals:    06/03/24 1019   BP: 154/90   Pulse: 99   SpO2: 100%   Weight: 74.4 kg (164 lb)   Height: 167.6 cm (66\")            4/1/2024     8:18 AM   PHQ-2/PHQ-9 Depression Screening   Little Interest or Pleasure in Doing Things 0-->not at all   Feeling Down, Depressed or Hopeless 0-->not at all   PHQ-9: Brief Depression Severity Measure Score 0                 Physical Exam  Physical Exam  The patient's appearance is normal.  The patient's pupils are equal and round. Sclerae are clear. Conjunctiva is normal.  The patient's thyroid is normal. No goiter. No thyroid nodules palpable. No carotid bruits auscultated.  The patient's lungs are clear to auscultation. No wheezes.  The patient's heart has a regular rate and rhythm. No murmur, rub, or gallop.  No pretibial edema in the musculoskeletal system.    Vital Signs  The patient's blood pressure is 154/90.               Results                  Assessment and Plan           Assessment & Plan  1. Essential hypertension.  The patient's hypertension is currently not fully managed. Amlodipine will be added to the patient's current valsartan " dosage. The patient has been advised to acquire a blood pressure cuff , initially checking his blood pressure twice daily, then reducing to once or twice a week.    2. History of impaired fasting glucose.  Laboratory tests will be conducted. The patient is advised to continue with a healthy diet and exercise regimen.    Follow-up  The patient is scheduled for a follow-up visit in the office in 6 months.         Patient was given instructions and counseling regarding his condition or for health maintenance advice. Please see specific information pulled into the AVS if appropriate.     Patient or patient representative verbalized consent for the use of Ambient Listening during the visit with  Antonino Walden MD for chart documentation. 6/3/2024  10:58 EDT

## 2024-06-03 NOTE — PATIENT INSTRUCTIONS
Exercising to Stay Healthy  To become healthy and stay healthy, it is recommended that you do moderate-intensity and vigorous-intensity exercise. You can tell that you are exercising at a moderate intensity if your heart starts beating faster and you start breathing faster but can still hold a conversation. You can tell that you are exercising at a vigorous intensity if you are breathing much harder and faster and cannot hold a conversation while exercising.  How can exercise benefit me?  Exercising regularly is important. It has many health benefits, such as:  Improving overall fitness, flexibility, and endurance.  Increasing bone density.  Helping with weight control.  Decreasing body fat.  Increasing muscle strength and endurance.  Reducing stress and tension, anxiety, depression, or anger.  Improving overall health.  What guidelines should I follow while exercising?  Before you start a new exercise program, talk with your health care provider.  Do not exercise so much that you hurt yourself, feel dizzy, or get very short of breath.  Wear comfortable clothes and wear shoes with good support.  Drink plenty of water while you exercise to prevent dehydration or heat stroke.  Work out until your breathing and your heartbeat get faster (moderate intensity).  How often should I exercise?  Choose an activity that you enjoy, and set realistic goals. Your health care provider can help you make an activity plan that is individually designed and works best for you.  Exercise regularly as told by your health care provider. This may include:  Doing strength training two times a week, such as:  Lifting weights.  Using resistance bands.  Push-ups.  Sit-ups.  Yoga.  Doing a certain intensity of exercise for a given amount of time. Choose from these options:  A total of 150 minutes of moderate-intensity exercise every week.  A total of 75 minutes of vigorous-intensity exercise every week.  A mix of moderate-intensity and  vigorous-intensity exercise every week.  Children, pregnant women, people who have not exercised regularly, people who are overweight, and older adults may need to talk with a health care provider about what activities are safe to perform. If you have a medical condition, be sure to talk with your health care provider before you start a new exercise program.  What are some exercise ideas?  Moderate-intensity exercise ideas include:  Walking 1 mile (1.6 km) in about 15 minutes.  Biking.  Hiking.  Golfing.  Dancing.  Water aerobics.  Vigorous-intensity exercise ideas include:  Walking 4.5 miles (7.2 km) or more in about 1 hour.  Jogging or running 5 miles (8 km) in about 1 hour.  Biking 10 miles (16.1 km) or more in about 1 hour.  Lap swimming.  Roller-skating or in-line skating.  Cross-country skiing.  Vigorous competitive sports, such as football, basketball, and soccer.  Jumping rope.  Aerobic dancing.  What are some everyday activities that can help me get exercise?  Yard work, such as:  Pushing a .  Raking and bagging leaves.  Washing your car.  Pushing a stroller.  Shoveling snow.  Gardening.  Washing windows or floors.  How can I be more active in my day-to-day activities?  Use stairs instead of an elevator.  Take a walk during your lunch break.  If you drive, park your car farther away from your work or school.  If you take public transportation, get off one stop early and walk the rest of the way.  Stand up or walk around during all of your indoor phone calls.  Get up, stretch, and walk around every 30 minutes throughout the day.  Enjoy exercise with a friend. Support to continue exercising will help you keep a regular routine of activity.  Where to find more information  You can find more information about exercising to stay healthy from:  [place] Department of Health and Human Services: www.hhs.gov  Centers for Disease Control and Prevention (CDC): www.cdc.gov  Summary  Exercising regularly is  important. It will improve your overall fitness, flexibility, and endurance.  Regular exercise will also improve your overall health. It can help you control your weight, reduce stress, and improve your bone density.  Do not exercise so much that you hurt yourself, feel dizzy, or get very short of breath.  Before you start a new exercise program, talk with your health care provider.  This information is not intended to replace advice given to you by your health care provider. Make sure you discuss any questions you have with your health care provider.  Document Revised: 04/15/2022 Document Reviewed: 04/15/2022  [employer]vier Patient Education © 2024 [employer]

## 2024-12-04 ENCOUNTER — OFFICE VISIT (OUTPATIENT)
Dept: FAMILY MEDICINE CLINIC | Facility: CLINIC | Age: 49
End: 2024-12-04
Payer: COMMERCIAL

## 2024-12-04 VITALS
DIASTOLIC BLOOD PRESSURE: 84 MMHG | WEIGHT: 165 LBS | SYSTOLIC BLOOD PRESSURE: 152 MMHG | HEIGHT: 66 IN | OXYGEN SATURATION: 99 % | BODY MASS INDEX: 26.52 KG/M2 | HEART RATE: 95 BPM

## 2024-12-04 DIAGNOSIS — R35.1 NOCTURIA: ICD-10-CM

## 2024-12-04 DIAGNOSIS — D72.828 NEUTROPHILIC LEUKOCYTOSIS: ICD-10-CM

## 2024-12-04 DIAGNOSIS — R73.01 IFG (IMPAIRED FASTING GLUCOSE): ICD-10-CM

## 2024-12-04 DIAGNOSIS — E78.49 OTHER HYPERLIPIDEMIA: ICD-10-CM

## 2024-12-04 DIAGNOSIS — R79.89 ELEVATED LFTS: ICD-10-CM

## 2024-12-04 DIAGNOSIS — I10 ESSENTIAL HYPERTENSION: Primary | ICD-10-CM

## 2024-12-04 PROCEDURE — 99214 OFFICE O/P EST MOD 30 MIN: CPT | Performed by: FAMILY MEDICINE

## 2024-12-04 RX ORDER — AMLODIPINE BESYLATE 5 MG/1
5 TABLET ORAL NIGHTLY
Qty: 90 TABLET | Refills: 2 | Status: SHIPPED | OUTPATIENT
Start: 2024-12-04 | End: 2025-08-31

## 2024-12-04 RX ORDER — VALSARTAN 320 MG/1
320 TABLET ORAL DAILY
Qty: 90 TABLET | Refills: 2 | Status: SHIPPED | OUTPATIENT
Start: 2024-12-04 | End: 2025-08-31

## 2024-12-04 NOTE — PATIENT INSTRUCTIONS
Carbohydrate Counting for Diabetes Mellitus, Adult  Carbohydrate counting is a method of keeping track of how many carbohydrates you eat. Eating carbohydrates increases the amount of sugar (glucose) in the blood. Counting how many carbohydrates you eat improves how well you manage your blood glucose. This, in turn, helps you manage your diabetes.  Carbohydrates are measured in grams (g) per serving. It is important to know how many carbohydrates (in grams or by serving size) you can have in each meal. This is different for every person. A dietitian can help you make a meal plan and calculate how many carbohydrates you should have at each meal and snack.  What foods contain carbohydrates?  Carbohydrates are found in the following foods:  Grains, such as breads and cereals.  Dried beans and soy products.  Starchy vegetables, such as potatoes, peas, and corn.  Fruit and fruit juices.  Milk and yogurt.  Sweets and snack foods, such as cake, cookies, candy, chips, and soft drinks.  How do I count carbohydrates in foods?  There are two ways to count carbohydrates in food. You can read food labels or learn standard serving sizes of foods. You can use either of these methods or a combination of both.  Using the Nutrition Facts label  The Nutrition Facts list is included on the labels of almost all packaged foods and beverages in the [place]. It includes:  The serving size.  Information about nutrients in each serving, including the grams of carbohydrate per serving.  To use the Nutrition Facts, decide how many servings you will have. Then, multiply the number of servings by the number of carbohydrates per serving. The resulting number is the total grams of carbohydrates that you will be having.  Learning the standard serving sizes of foods  When you eat carbohydrate foods that are not packaged or do not include Nutrition Facts on the label, you need to measure the servings in order to count the grams of  carbohydrates.  Measure the foods that you will eat with a food scale or measuring cup, if needed.  Decide how many standard-size servings you will eat.  Multiply the number of servings by 15. For foods that contain carbohydrates, one serving equals 15 g of carbohydrates.  For example, if you eat 2 cups or 10 oz (300 g) of strawberries, you will have eaten 2 servings and 30 g of carbohydrates (2 servings x 15 g = 30 g).  For foods that have more than one food mixed, such as soups and casseroles, you must count the carbohydrates in each food that is included.  The following list contains standard serving sizes of common carbohydrate-rich foods. Each of these servings has about 15 g of carbohydrates:  1 slice of bread.  1 six-inch (15 cm) tortilla.  ? cup or 2 oz (53 g) cooked rice or pasta.  ½ cup or 3 oz (85 g) cooked or canned, drained and rinsed beans or lentils.  ½ cup or 3 oz (85 g) starchy vegetable, such as peas, corn, or squash.  ½ cup or 4 oz (120 g) hot cereal.  ½ cup or 3 oz (85 g) boiled or mashed potatoes, or ¼ or 3 oz (85 g) of a large baked potato.  ½ cup or 4 fl oz (118 mL) fruit juice.  1 cup or 8 fl oz (237 mL) milk.  1 small or 4 oz (106 g) apple.  ½ or 2 oz (63 g) of a medium banana.  1 cup or 5 oz (150 g) strawberries.  3 cups or 1 oz (28.3 g) popped popcorn.  What is an example of carbohydrate counting?  To calculate the grams of carbohydrates in this sample meal, follow the steps shown below.  Sample meal  3 oz (85 g) chicken breast.  ? cup or 4 oz (106 g) brown rice.  ½ cup or 3 oz (85 g) corn.  1 cup or 8 fl oz (237 mL) milk.  1 cup or 5 oz (150 g) strawberries with sugar-free whipped topping.  Carbohydrate calculation  Identify the foods that contain carbohydrates:  Rice.  Corn.  Milk.  Strawberries.  Calculate how many servings you have of each food:  2 servings rice.  1 serving corn.  1 serving milk.  1 serving strawberries.  Multiply each number of servings by 15  servings rice x 15  g = 30 g.  1 serving corn x 15 g = 15 g.  1 serving milk x 15 g = 15 g.  1 serving strawberries x 15 g = 15 g.  Add together all of the amounts to find the total grams of carbohydrates eaten:  30 g + 15 g + 15 g + 15 g = 75 g of carbohydrates total.  What are tips for following this plan?  Shopping  Develop a meal plan and then make a shopping list.  Buy fresh and frozen vegetables, fresh and frozen fruit, dairy, eggs, beans, lentils, and whole grains.  Look at food labels. Choose foods that have more fiber and less sugar.  Avoid processed foods and foods with added sugars.  Meal planning  Aim to have the same number of grams of carbohydrates at each meal and for each snack time.  Plan to have regular, balanced meals and snacks.  Where to find more information  American Diabetes Association: diabetes.org  Centers for Disease Control and Prevention: cdc.gov  Academy of Nutrition and Dietetics: eatright.org  Association of Diabetes Care & Education Specialists: diabeteseducator.org  Summary  Carbohydrate counting is a method of keeping track of how many carbohydrates you eat.  Eating carbohydrates increases the amount of sugar (glucose) in your blood.  Counting how many carbohydrates you eat improves how well you manage your blood glucose. This helps you manage your diabetes.  A dietitian can help you make a meal plan and calculate how many carbohydrates you should have at each meal and snack.  This information is not intended to replace advice given to you by your health care provider. Make sure you discuss any questions you have with your health care provider.  Document Revised: 07/21/2021 Document Reviewed: 07/21/2021  [employer] Patient Education © 2024 [employer]  Exercising to Stay Healthy  To become healthy and stay healthy, it is recommended that you do moderate-intensity and vigorous-intensity exercise. You can tell that you are exercising at a moderate intensity if your heart starts beating faster and you  start breathing faster but can still hold a conversation. You can tell that you are exercising at a vigorous intensity if you are breathing much harder and faster and cannot hold a conversation while exercising.  How can exercise benefit me?  Exercising regularly is important. It has many health benefits, such as:  Improving overall fitness, flexibility, and endurance.  Increasing bone density.  Helping with weight control.  Decreasing body fat.  Increasing muscle strength and endurance.  Reducing stress and tension, anxiety, depression, or anger.  Improving overall health.  What guidelines should I follow while exercising?  Before you start a new exercise program, talk with your health care provider.  Do not exercise so much that you hurt yourself, feel dizzy, or get very short of breath.  Wear comfortable clothes and wear shoes with good support.  Drink plenty of water while you exercise to prevent dehydration or heat stroke.  Work out until your breathing and your heartbeat get faster (moderate intensity).  How often should I exercise?  Choose an activity that you enjoy, and set realistic goals. Your health care provider can help you make an activity plan that is individually designed and works best for you.  Exercise regularly as told by your health care provider. This may include:  Doing strength training two times a week, such as:  Lifting weights.  Using resistance bands.  Push-ups.  Sit-ups.  Yoga.  Doing a certain intensity of exercise for a given amount of time. Choose from these options:  A total of 150 minutes of moderate-intensity exercise every week.  A total of 75 minutes of vigorous-intensity exercise every week.  A mix of moderate-intensity and vigorous-intensity exercise every week.  Children, pregnant women, people who have not exercised regularly, people who are overweight, and older adults may need to talk with a health care provider about what activities are safe to perform. If you have a  medical condition, be sure to talk with your health care provider before you start a new exercise program.  What are some exercise ideas?  Moderate-intensity exercise ideas include:  Walking 1 mile (1.6 km) in about 15 minutes.  Biking.  Hiking.  Golfing.  Dancing.  Water aerobics.  Vigorous-intensity exercise ideas include:  Walking 4.5 miles (7.2 km) or more in about 1 hour.  Jogging or running 5 miles (8 km) in about 1 hour.  Biking 10 miles (16.1 km) or more in about 1 hour.  Lap swimming.  Roller-skating or in-line skating.  Cross-country skiing.  Vigorous competitive sports, such as football, basketball, and soccer.  Jumping rope.  Aerobic dancing.  What are some everyday activities that can help me get exercise?  Yard work, such as:  Pushing a .  Raking and bagging leaves.  Washing your car.  Pushing a stroller.  Shoveling snow.  Gardening.  Washing windows or floors.  How can I be more active in my day-to-day activities?  Use stairs instead of an elevator.  Take a walk during your lunch break.  If you drive, park your car farther away from your work or school.  If you take public transportation, get off one stop early and walk the rest of the way.  Stand up or walk around during all of your indoor phone calls.  Get up, stretch, and walk around every 30 minutes throughout the day.  Enjoy exercise with a friend. Support to continue exercising will help you keep a regular routine of activity.  Where to find more information  You can find more information about exercising to stay healthy from:  [place] Department of Health and Human Services: www.hhs.gov  Centers for Disease Control and Prevention (CDC): www.cdc.gov  Summary  Exercising regularly is important. It will improve your overall fitness, flexibility, and endurance.  Regular exercise will also improve your overall health. It can help you control your weight, reduce stress, and improve your bone density.  Do not exercise so much that you hurt  yourself, feel dizzy, or get very short of breath.  Before you start a new exercise program, talk with your health care provider.  This information is not intended to replace advice given to you by your health care provider. Make sure you discuss any questions you have with your health care provider.  Document Revised: 04/15/2022 Document Reviewed: 04/15/2022  [employer] Patient Education © 2024 [employer]

## 2024-12-04 NOTE — ASSESSMENT & PLAN NOTE
Elevated white blood cell count is once again identified.  I have recommended that the patient discontinue smoking.  We did review his hematology note and that was the recommendation as well.  We will continue to follow this condition with serial blood count evaluation.  Orders:    CBC & Differential; Future

## 2024-12-04 NOTE — ASSESSMENT & PLAN NOTE
Elevated liver function tests are identified on most recent labs.  I have recommended that he discontinue alcohol intake over the next 6 months and recheck with labs.

## 2024-12-04 NOTE — PROGRESS NOTES
"Chief Complaint  Follow-up (6 month), History of impaired fasting glucose, and Hypertension    Subjective        HPI      The patient presents for evaluation of multiple medical concerns.    He has not been monitoring his blood pressure at home but reports no side effects from his medication, which he takes regularly. His regimen includes amlodipine,and valsartan.    He is curious if his elevated white blood cell count could be related to inflammation or arthritis. He underwent elbow surgery a year ago and was referred to a specialist due to his high white blood cell count.    He is currently working on quitting smoking and reducing his alcohol consumption.   He occasionally wakes up during the night to use the bathroom. He is making dietary changes to manage his high glucose levels, including reducing his intake of starches and sweets. His diet includes a significant amount of rice. He is also planning to incorporate gym workouts into his routine.    SOCIAL HISTORY  - Still smoking and working on quitting  - Drinks alcohol    FAMILY HISTORY  - Mother is prediabetic           Vital Signs:   Vitals:    12/04/24 0935   BP: 152/84   Pulse: 95   SpO2: 99%   Weight: 74.8 kg (165 lb)   Height: 167.6 cm (66\")            4/1/2024     8:18 AM   PHQ-2/PHQ-9 Depression Screening   Retired Little Interest or Pleasure in Doing Things 0-->not at all   Retired Feeling Down, Depressed or Hopeless 0-->not at all   Retired PHQ-9: Brief Depression Severity Measure Score 0                 Physical Exam     - Appears normal  - No carotid bruits in the neck  - Lungs are clear with normal breath sounds, no wheezing  - Heart exhibits a regular rate and rhythm, no murmurs, rubs, or gallops  - No swelling or pretibial edema in the legs    - Vital Signs:    - Blood pressure: 130/82 in the right arm while sitting    - BMI: 26.63       The following data was reviewed by: Antonino Walden MD on 12/04/2024:      Results  - Laboratory Studies:    - " Sodium: 141    - Potassium: 4.5    - Chloride: 104    - BUN: 16    - Creatinine: 0.81    - EGFR: 107    - Calcium: 9.6    - Glucose: 104    - AST: 48    - ALT: 53    - Hemoglobin A1c: 5.4    - TSH: 0.939    - Total cholesterol: 161    - HDL: 68    - LDL: 81    - Triglycerides: 59    - White blood cell count: 13.5                Assessment and Plan        Essential hypertension    The current medical regimen is effective;  continue present plan and medications.    Orders:    amLODIPine (NORVASC) 5 MG tablet; Take 1 tablet by mouth Every Night for 270 days.    valsartan (DIOVAN) 320 MG tablet; Take 1 tablet by mouth Daily for 270 days.    Comprehensive Metabolic Panel; Future    CBC & Differential; Future    TSH; Future    Neutrophilic leukocytosis  Elevated white blood cell count is once again identified.  I have recommended that the patient discontinue smoking.  We did review his hematology note and that was the recommendation as well.  We will continue to follow this condition with serial blood count evaluation.  Orders:    CBC & Differential; Future    IFG (impaired fasting glucose)  Elevated fasting blood sugar runs in the family and patient's condition is stable.  He will continue to monitor low carbohydrate diet and plans on increasing exercise to address this condition  Orders:    Comprehensive Metabolic Panel; Future    Elevated LFTs  Elevated liver function tests are identified on most recent labs.  I have recommended that he discontinue alcohol intake over the next 6 months and recheck with labs.       Other hyperlipidemia     Diet to continue to monitor lipids.  Currently his lipid status is well-controlled with his current diet.  Orders:    Lipid Panel With / Chol / HDL Ratio; Future    CK; Future    Nocturia  Nocturia symptoms are stable. PSA will be monitored with annual labs.  Orders:    PSA DIAGNOSTIC; Future         Return in about 6 months (around 6/4/2025) for recheck/refill medication.     Patient  was given instructions and counseling regarding his condition or for health maintenance advice. Please see specific information pulled into the AVS if appropriate.     Patient or patient representative verbalized consent for the use of Ambient Listening during the visit with  Antonino Walden MD for chart documentation. 12/4/2024  10:10 EST

## 2024-12-04 NOTE — ASSESSMENT & PLAN NOTE
The current medical regimen is effective;  continue present plan and medications.    Orders:    amLODIPine (NORVASC) 5 MG tablet; Take 1 tablet by mouth Every Night for 270 days.    valsartan (DIOVAN) 320 MG tablet; Take 1 tablet by mouth Daily for 270 days.    Comprehensive Metabolic Panel; Future    CBC & Differential; Future    TSH; Future     Review of Systems      Genitourinary none   Cardiology ankle/leg swelling   Gastrointestinal none   Neurology none   Constitutional none   Integumentary none   Psychiatry none   Musculoskeletal back pain   Pulmonary none   ENT none   Endocrine none   Hematological none

## 2024-12-04 NOTE — ASSESSMENT & PLAN NOTE
Elevated fasting blood sugar runs in the family and patient's condition is stable.  He will continue to monitor low carbohydrate diet and plans on increasing exercise to address this condition  Orders:    Comprehensive Metabolic Panel; Future

## 2025-06-02 ENCOUNTER — OFFICE VISIT (OUTPATIENT)
Dept: FAMILY MEDICINE CLINIC | Facility: CLINIC | Age: 50
End: 2025-06-02
Payer: COMMERCIAL

## 2025-06-02 VITALS
SYSTOLIC BLOOD PRESSURE: 150 MMHG | BODY MASS INDEX: 26.36 KG/M2 | HEIGHT: 66 IN | OXYGEN SATURATION: 98 % | HEART RATE: 92 BPM | WEIGHT: 164 LBS | DIASTOLIC BLOOD PRESSURE: 90 MMHG

## 2025-06-02 DIAGNOSIS — I10 ESSENTIAL HYPERTENSION: Primary | ICD-10-CM

## 2025-06-02 DIAGNOSIS — F17.200 TOBACCO DEPENDENCE: ICD-10-CM

## 2025-06-02 DIAGNOSIS — D72.828 NEUTROPHILIC LEUKOCYTOSIS: ICD-10-CM

## 2025-06-02 PROBLEM — R79.89 ELEVATED LFTS: Status: RESOLVED | Noted: 2024-12-04 | Resolved: 2025-06-02

## 2025-06-02 PROCEDURE — 99214 OFFICE O/P EST MOD 30 MIN: CPT | Performed by: FAMILY MEDICINE

## 2025-06-02 RX ORDER — ADALIMUMAB-ADBM 40MG/0.8ML
KIT SUBCUTANEOUS
COMMUNITY
Start: 2025-03-21

## 2025-06-02 RX ORDER — AMLODIPINE BESYLATE 5 MG/1
5 TABLET ORAL NIGHTLY
Qty: 90 TABLET | Refills: 2 | Status: SHIPPED | OUTPATIENT
Start: 2025-06-02 | End: 2026-02-27

## 2025-06-02 RX ORDER — VALSARTAN 320 MG/1
320 TABLET ORAL DAILY
Qty: 90 TABLET | Refills: 2 | Status: SHIPPED | OUTPATIENT
Start: 2025-06-02 | End: 2026-02-27

## 2025-06-02 NOTE — ASSESSMENT & PLAN NOTE
The current medical regimen is effective;  continue present plan and medications.    Orders:    amLODIPine (NORVASC) 5 MG tablet; Take 1 tablet by mouth Every Night for 270 days.    valsartan (DIOVAN) 320 MG tablet; Take 1 tablet by mouth Daily for 270 days.

## 2025-06-02 NOTE — PROGRESS NOTES
Chief Complaint  Follow-up (6 month), Hypertension, Neutrophilic leukocytosis, IFG, Elevated LFTs, Hyperlipidemia, and Nocturia    Subjective        HPI      The patient presents for a blood pressure recheck, elevated WBC count, rheumatoid arthritis, weight loss, and smoking cessation.    He reports a 10-pound weight loss attributed to dietary changes, including increased vegetables and fruits and reduced meat and rice intake. He consumes 1-2 meals daily and has added walking to his routine, considering weightlifting for muscle strengthening.    He is interested in lung cancer screening due to his smoking history. 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 He began smoking at 15, initially half a pack daily, increasing to a full pack 1-2 years ago. Smoking is primarily social or alcohol-related. He has attempted cessation multiple times, achieving months of abstinence but relapsing. He previously tried nicotine gum.    Rheumatoid arthritis is managed with a biosimilar to Humira after sulfasalazine was deemed ineffective and discontinued a week ago. He is scheduled for follow-up on 06/12/2025 and has been advised to consider wrist fusion surgery. He reports increased bruising and left hand strength surpassing the right. He is not on steroids but received steroid injections a year ago before surgery.    SOCIAL HISTORY  Smokes one pack daily, started at age 15.    MEDICATIONS  Current: biosimilar to Humira.  Landen Guido  reports that he has been smoking cigarettes, cigars, and electronic cigarette. He started smoking about 35 years ago. He has a 18.9 pack-year smoking history. He has never used smokeless tobacco. I have educated him on the risk of diseases from using tobacco products such as cancer, COPD, and heart disease.     I advised him to quit and he is not willing to quit.    I spent 5 minutes counseling the patient.                Vital Signs:   Vitals:    06/02/25 1029   BP:  "150/90   Pulse: 92   SpO2: 98%   Weight: 74.4 kg (164 lb)   Height: 167.6 cm (66\")            2025    10:34 AM   PHQ-2/PHQ-9 Depression Screening   Little interest or pleasure in doing things Not at all   Feeling down, depressed, or hopeless Not at all               Physical Exam     General Appearance: Normal appearance, No acute distress.  Vital signs: BMI: 26.  Respiratory: No respiratory distress, lungs clear to auscultation with no wheezes or rubs.  Cardiovascular: regular rate and rhythm with no murmurs, rubs, or gallop.  Extremities: no pretibial edema bilaterally.  Psychiatric: normal affect, pleasant, cooperative with exam.  Other observations: no carotid bruits auscultated bilaterally.       The following data was reviewed by: Antonino Walden MD on 2025:      Results  - Lab Studies:    - Na: 140    - K: 4.5    - Cl: 100    - Ca: 9.6    - BUN: 10    - Cr: 0.74    - Glucose: 87    - AST: 37    - ALT: 39    - Chol: 179    - HDL: 89    - LDL: 79    - T    - WBC: 11.1    - Hgb: 13.7    - Plt: 358    - PSA: 0.7                Assessment and Plan      1. Health maintenance.  Tdap vaccine due. Lung cancer screening discussed due to smoking history. Benefits and risks of low-dose CT explained. Advised to check insurance coverage.    2. Smoking cessation.  Recommended Chantix starter and continuing packs. Nicotine patches and lozenges discussed as alternatives. Advised smoking can continue while on Chantix until day 90.  Patient will consider    3. Elevated WBC count.  Improved from 13.5 to 11.1 but remains slightly elevated, likely related to rheumatoid arthritis. No immediate intervention; advised monitoring and rheumatologist follow-up.    4. Rheumatoid arthritis.  On biosimilar to Humira. Will discuss sulfasalazine discontinuation with rheumatologist. Advised to continue current treatment and follow up on 2025.    5. Weight loss.  10-pound loss likely due to dietary changes. Advised adequate " caloric intake, adding milk or larger portions. Recommended aerobic activity and light weightlifting.    6. Blood pressure management.  Prescription sent to [facility].    Follow-up  Follow-up in early December.         Essential hypertension    The current medical regimen is effective;  continue present plan and medications.    Orders:    amLODIPine (NORVASC) 5 MG tablet; Take 1 tablet by mouth Every Night for 270 days.    valsartan (DIOVAN) 320 MG tablet; Take 1 tablet by mouth Daily for 270 days.    Neutrophilic leukocytosis  Slight improvement. Continue to monitor       Tobacco dependence    Information shared. Recommended chantix, or patch/lozenge method. He will think about.            Return in about 6 months (around 12/2/2025) for recheck/refill medication.     Patient was given instructions and counseling regarding his condition or for health maintenance advice. Please see specific information pulled into the AVS if appropriate.     Patient or patient representative verbalized consent for the use of Ambient Listening during the visit with  Antonino Walden MD for chart documentation. 6/2/2025  11:03 EDT